# Patient Record
Sex: MALE | Race: WHITE | URBAN - METROPOLITAN AREA
[De-identification: names, ages, dates, MRNs, and addresses within clinical notes are randomized per-mention and may not be internally consistent; named-entity substitution may affect disease eponyms.]

---

## 2018-03-05 ENCOUNTER — CONVERSION ENCOUNTER (OUTPATIENT)
Dept: OTHER | Facility: HOSPITAL | Age: 78
End: 2018-03-05

## 2018-03-05 ENCOUNTER — OFFICE VISIT CONVERTED (OUTPATIENT)
Dept: CARDIOLOGY | Facility: CLINIC | Age: 78
End: 2018-03-05
Attending: SPECIALIST

## 2018-06-11 ENCOUNTER — CONVERSION ENCOUNTER (OUTPATIENT)
Dept: OTHER | Facility: HOSPITAL | Age: 78
End: 2018-06-11

## 2018-06-11 ENCOUNTER — OFFICE VISIT CONVERTED (OUTPATIENT)
Dept: CARDIOLOGY | Facility: CLINIC | Age: 78
End: 2018-06-11
Attending: SPECIALIST

## 2018-09-20 ENCOUNTER — OFFICE VISIT CONVERTED (OUTPATIENT)
Dept: CARDIOLOGY | Facility: CLINIC | Age: 78
End: 2018-09-20
Attending: SPECIALIST

## 2018-09-20 ENCOUNTER — CONVERSION ENCOUNTER (OUTPATIENT)
Dept: OTHER | Facility: HOSPITAL | Age: 78
End: 2018-09-20

## 2019-01-24 ENCOUNTER — OFFICE VISIT CONVERTED (OUTPATIENT)
Dept: CARDIOLOGY | Facility: CLINIC | Age: 79
End: 2019-01-24
Attending: SPECIALIST

## 2019-01-24 ENCOUNTER — CONVERSION ENCOUNTER (OUTPATIENT)
Dept: OTHER | Facility: HOSPITAL | Age: 79
End: 2019-01-24

## 2021-05-14 NOTE — PROGRESS NOTES
"   Progress Note      Patient Name: Arias Dias   Patient ID: [id number]   Sex: Male   YOB: 1940    Primary Care Provider: Alisa SHIN   Referring Provider: Alisa SHIN    Visit Date: January 24, 2019    Provider: Mingo Garcia MD   Location: [facility]   Location Address: [address] [place] [address]  [id number]   Location Phone: [phone]          Chief Complaint  · Atrial fibrillation  · Hypertension  · Pedal edema      History Of Present Illness  Arias Dias is a 78 year old /White male with a history of chronic atrial fibrillation; palpitations; hypertension. He has developed pedal edema for the last 2-3 weeks which has got worse recently. No syncopal or presyncopal episodes. Patient denies chest pain. He has shortness of breath on exertion.   Medications  Meds at present include: Nifedipine ER 60 mg qd; Irbesartan 160/12.5 mg qd; Metoprolol 50 mg bid; Tamsulosin 0.4 mg qd; Warfarin 5 mg as directed.   PAST MEDICAL HISTORY: negative for diabetes and chest pain; positive for hypertension.   FAMILY HISTORY: positive for diabetes, negative for hypertension and heart disease.   PSYCHO/SOCIAL HISTORY: negative for depression; does not drink alcohol and does not smoke.       Review of Systems  · Cardiovascular  o Admits  o : shortness of breath and pedal edema  o Denies  o : chest pain or angina pectoris  · Respiratory  o Denies  o : chronic or frequent cough, asthma or wheezing      Vitals  Date Time BP Position Site L\R Cuff Size HR RR TEMP(F) WT  HT  BMI kg/m2 BSA m2 O2 Sat HC       11/02/2017 12:42 /77 Sitting    91 - R   225lbs 0oz 6'  0\" 30.52 2.28     03/05/2018 12:17 /74 Sitting    93 - R   250lbs 0oz 6'  0\" 33.91 2.4     06/11/2018 12:43 /80 Sitting    80 - R   248lbs 0oz 6'  0\" 33.63 2.39     09/20/2018 02:09 /70 Sitting    70 - R   243lbs 0oz 6'  0\" 32.96 2.37     01/24/2019 12:44 /73 Sitting    75 - R   " "252lbs 0oz 6'  0\" 34.18 2.41           Physical Examination  · Constitutional  o Appearance  o : Alert and Oriented X3. The patient is obese.   · Respiratory  o Inspection of Chest  o : No chest wall deformities, moving equal  o Auscultation of Lungs  o : Good air entry with vesicular breath sounds  · Cardiovascular  o Heart  o :   § Auscultation of Heart  § : S1 and S2 are regular. No S3. No S4. No murmurs.  o Peripheral Vascular System  o :   § Extremities  § : Peripheral pulses were well felt. 2+ pedal edema. No cyanosis.  · Gastrointestinal  o Abdominal Examination  o : No masses or tenderness noted.   · EKG  o EKG  o : was done today  o Indications  o : atrial fibrillation  o Results  o : shows atrial fibrillation ith controlled heart rate; left axis deviation; nonspecific ST T-wave changes          Assessment     IMPRESSION/PLAN    1. Chronic atrial fibrillation with a controlled heart rate.  Continue current dose of Warfarin for stroke prevention and Metoprolol for rate control.  2. Essential hypertension controlled.  Continue current dose of Nifedipine and Irbesartan  3. Pedal edema. In view of his pedal edema I asked him to decrease his fluid and salt intake. I will start him on Lasix 40 mg qd. Will check his BMP in two weeks.  4. Nonrheumatic mitral valve insufficiency stable.  5. He will see me back in 3 months.    MD LEVAR Bradford/wt          Plan  · Instructions  o This note was transcribed by Elvira Tobar. LEVAR/wt  o The above service was transcribed by Elvira Tobar on my behalf and I attest to the accuracy of the note. LEVAR            Electronically Signed by: Madison Tobar-, -Author on January 25, 2019 03:04:06 PM  Electronically Co-signed by: Mingo Garcia MD -Reviewer on January 30, 2019 12:04:58 PM  "

## 2021-05-14 NOTE — PROGRESS NOTES
"   Progress Note      Patient Name: Arias Dias   Patient ID: [id number]   Sex: Male   YOB: 1940    Primary Care Provider: Alisa RAI   Referring Provider: Alisa RAI    Visit Date: March 5, 2018    Provider: Mingo Garcia MD   Location: [facility]   Location Address: [address] [place] [address]  [id number]   Location Phone: [phone]          Chief Complaint  · Atrial fibrillation  · Hypertension      History Of Present Illness  Arias Dias is a 77 year old /White male with a history of atrial fibrillation; hypertension. No syncopal or presyncopal episodes. Patient denies chest pain. No shortness of breath, PND or orthopnea.   Medications  Meds at present include: Nifedipine ER 60 mg; Valsartan/Hyrochlorathiazide 160//12.5 mg qd; Metoprolol 50 mg bid; Tamsulosin 0.4 mg qd; Warfarin 5 mg as directed.   PAST MEDICAL HISTORY: negative for diabetes and chest pain; positive for hypertension.   FAMILY HISTORY: positive for diabetes and hypertension; negative for heart disease.   PSYCHO/SOCIAL HISTORY: negative for depression; does not drink alcohol and does not smoke.       Review of Systems  · Cardiovascular  o Denies  o : swelling (feet, ankles, hands), shortness of breath while walking or lying flat, chest pain or angina pectoris; palpitations  · Respiratory  o Denies  o : chronic or frequent cough, asthma or wheezing      Vitals  Date Time BP Position Site L\R Cuff Size HR RR TEMP(F) WT  HT  BMI kg/m2 BSA m2 O2 Sat HC       11/02/2017 12:42 /77 Sitting    91 - R   225lbs 0oz 6'  0\" 30.52 2.28     03/05/2018 12:17 /74 Sitting    93 - R   250lbs 0oz 6'  0\" 33.91 2.4           Physical Examination  · Constitutional  o Appearance  o : Alert and Oriented X3. The patient is obese.   · Respiratory  o Inspection of Chest  o : No chest wall deformities, moving equal  o Auscultation of Lungs  o : Good air entry with vesicular breath " sounds  · Cardiovascular  o Heart  o :   § Auscultation of Heart  § : S1 and S2 are regular. No S3. No S4. No murmurs.  o Peripheral Vascular System  o :   § Extremities  § : Peripheral pulses were well felt. No edema. No cyanosis.  · Gastrointestinal  o Abdominal Examination  o : No masses or tenderness noted.   · EKG  o EKG  o : was not performed in the office today          Assessment     IMPRESSION/PLAN  1. Chronic atrial fibrillation with a controlled heart rate.  Continue current dose of Warfarin and Metoprolol.  2. Essential hypertension controlled.  Continue current dose of Nifedipine.  3. Valvular heart disease stable. Nonrheumatic mitral valve insufficiency stable.  4. He will see me back in 3-4 months.      LEAVR/wft       Plan  · Instructions  o This note was transcribed by Elvira Tobar. LEVAR/wt  o The above service was transcribed by Elvira Tobar on my behalf and I attest to the accuracy of the note. LEVAR            Electronically Signed by: Madison Tobar-, -Author on March 6, 2018 07:24:38 AM  Electronically Co-signed by: Mingo Garcia MD -Reviewer on March 18, 2018 10:47:43 AM

## 2021-05-14 NOTE — PROGRESS NOTES
"   Progress Note      Patient Name: Arias Dias   Patient ID: [id number]   Sex: Male   YOB: 1940    Primary Care Provider: Alisa RAI   Referring Provider: Alisa RAI    Visit Date: June 11, 2018    Provider: Mingo Garcia MD   Location: [facility]   Location Address: [address] [place] [address]  [id number]   Location Phone: [phone]          Chief Complaint  · Atrial fibrillation  · Palpitations  · Pedal edema      History Of Present Illness  Arias Dias is a 77 year old /White male with a history of atrial fibrillation; hypertension. He has some occasional palpitations and mild pedal edema. No syncopal or presyncopal episodes. Blood pressure well controlled at home. Patient denies chest pain. No shortness of breath, PND or orthopnea.   Medications  Meds at present include: Nifedipine ER 60 mg qd; Valsartan/Hyrochlorathiazide 160//12.5 mg qd; Metoprolol 50 mg bid; Tamsulosin 0.4 mg qd; Warfarin 5 mg as directed.   PAST MEDICAL HISTORY: negative for diabetes and chest pain; positive for hypertension.   FAMILY HISTORY: positive for diabetes; negative for hypertension and heart disease.   PSYCHO/SOCIAL HISTORY: negative for depression; does not drink alcohol and does not smoke.       Review of Systems  · Cardiovascular  o Admits  o : pedal edema and occasional palpitations  o Denies  o : shortness of breath while walking or lying flat, chest pain or angina pectoris  · Respiratory  o Denies  o : chronic or frequent cough, asthma or wheezing      Vitals  Date Time BP Position Site L\R Cuff Size HR RR TEMP(F) WT  HT  BMI kg/m2 BSA m2 O2 Sat HC       11/02/2017 12:42 /77 Sitting    91 - R   225lbs 0oz 6'  0\" 30.52 2.28     03/05/2018 12:17 /74 Sitting    93 - R   250lbs 0oz 6'  0\" 33.91 2.4     06/11/2018 12:43 /80 Sitting    80 - R   248lbs 0oz 6'  0\" 33.63 2.39           Physical Examination  · Constitutional  o Appearance  o : " Alert and Oriented X3. The patient is obese.   · Respiratory  o Inspection of Chest  o : No chest wall deformities, moving equal  o Auscultation of Lungs  o : Good air entry with vesicular breath sounds  · Cardiovascular  o Heart  o :   § Auscultation of Heart  § : S1 and S2 are regular. No S3. No S4. No murmurs.  o Peripheral Vascular System  o :   § Extremities  § : Peripheral pulses were well felt. No edema. No cyanosis.  · Gastrointestinal  o Abdominal Examination  o : No masses or tenderness noted.   · EKG  o EKG  o : Was performed in the office today  o Indications  o : atrial fibrillation  o Results  o : atrial fibrillation; left axis deviation; septal MI age undetermined.   o Comparison  o : no change from previous EKG wahich I compared to          Assessment     IMPRESSION/PLAN  1. Chronic atrial fibrillation with a controlled heart rate.  Continue current dose of Warfarin and Metoprolol.  2. Essential hypertension controlled.  Continue current dose of Nifedipine and Valsartan.  3. Nonrheumatic mitral valve insufficiency stable.  4. He will see me back in 3 months.      LEVAR/wft       Plan  · Instructions  o This note was transcribed by Elvira Tobar. LEVAR/wt  o The above service was transcribed by Elvira Tobar on my behalf and I attest to the accuracy of the note. LEVAR            Electronically Signed by: Madison Tobar-, -Author on June 12, 2018 12:01:08 PM  Electronically Co-signed by: Mingo Garcia MD -Reviewer on June 18, 2018 01:17:18 PM

## 2021-05-14 NOTE — PROGRESS NOTES
"   Progress Note      Patient Name: Arias Dias   Patient ID: [id number]   Sex: Male   YOB: 1940    Primary Care Provider: Alisa SHIN   Referring Provider: Alisa SHIN    Visit Date: September 20, 2018    Provider: Mingo Garcia MD   Location: [facility]   Location Address: [address] [place] [address]  [id number]   Location Phone: [phone]          Chief Complaint  · Atrial fibrillation  · Palpitations      History Of Present Illness  Arias Dias is a 78 year old /White male with a history of chronic atrial fibrillation; palpitations; hypertension. He has some occasional palpitations. No syncopal or presyncopal episodes. Patient denies chest pain. No shortness of breath, PND or orthopnea.   Medications  Meds at present include: Nifedipine ER 60 mg qd; Losartan 100 mg qd; Metoprolol 50 mg bid; Tamsulosin 0.4 mg qd; Warfarin 5 mg as directed.   PAST MEDICAL HISTORY: negative for diabetes and chest pain; positive for hypertension.   FAMILY HISTORY: negative for diabetes, hypertension and heart disease.   PSYCHO/SOCIAL HISTORY: negative for depression; does not drink alcohol and does not smoke.       Review of Systems  · Cardiovascular  o Admits  o : occasional palpitations  o Denies  o : shortness of breath while walking or lying flat, chest pain or angina pectoris  · Respiratory  o Denies  o : chronic or frequent cough, asthma or wheezing      Vitals  Date Time BP Position Site L\R Cuff Size HR RR TEMP(F) WT  HT  BMI kg/m2 BSA m2 O2 Sat HC       11/02/2017 12:42 /77 Sitting    91 - R   225lbs 0oz 6'  0\" 30.52 2.28     03/05/2018 12:17 /74 Sitting    93 - R   250lbs 0oz 6'  0\" 33.91 2.4     06/11/2018 12:43 /80 Sitting    80 - R   248lbs 0oz 6'  0\" 33.63 2.39     09/20/2018 02:09 /70 Sitting    70 - R   243lbs 0oz 6'  0\" 32.96 2.37           Physical Examination  · Constitutional  o Appearance  o : Alert and Oriented X3. The " patient is obese.   · Respiratory  o Inspection of Chest  o : No chest wall deformities, moving equal  o Auscultation of Lungs  o : Good air entry with vesicular breath sounds  · Cardiovascular  o Heart  o :   § Auscultation of Heart  § : S1 and S2 are regular. No S3. No S4. No murmurs.  o Peripheral Vascular System  o :   § Extremities  § : Peripheral pulses were well felt. No edema. No cyanosis.  · Gastrointestinal  o Abdominal Examination  o : No masses or tenderness noted.           Assessment     IMPRESSION/PLAN    1. Chronic atrial fibrillation with a controlled heart rate.  Continue current dose of Warfarin and Metoprolol.  2. Essential hypertension controlled.  Continue current dose of Nifedipine and Losartan  3. Nonrheumatic mitral valve insufficiency stable.  4. He will see me back in 6 months.      LEVAR/wt       Plan  · Instructions  o This note was transcribed by Elvira Tobar. LEVAR/wt  o The above service was transcribed by Elvira Tobar on my behalf and I attest to the accuracy of the note. LEVAR            Electronically Signed by: Madison Tobar-, -Author on September 21, 2018 08:07:31 AM  Electronically Co-signed by: Mingo Garcia MD -Reviewer on September 28, 2018 01:26:25 PM

## 2021-05-15 VITALS
HEIGHT: 72 IN | BODY MASS INDEX: 34.13 KG/M2 | WEIGHT: 252 LBS | DIASTOLIC BLOOD PRESSURE: 73 MMHG | SYSTOLIC BLOOD PRESSURE: 130 MMHG | HEART RATE: 75 BPM

## 2021-05-16 VITALS
DIASTOLIC BLOOD PRESSURE: 70 MMHG | HEART RATE: 70 BPM | WEIGHT: 243 LBS | SYSTOLIC BLOOD PRESSURE: 126 MMHG | BODY MASS INDEX: 32.91 KG/M2 | HEIGHT: 72 IN

## 2021-05-16 VITALS
WEIGHT: 248 LBS | BODY MASS INDEX: 33.59 KG/M2 | DIASTOLIC BLOOD PRESSURE: 80 MMHG | SYSTOLIC BLOOD PRESSURE: 127 MMHG | HEIGHT: 72 IN | HEART RATE: 80 BPM

## 2021-05-16 VITALS
SYSTOLIC BLOOD PRESSURE: 136 MMHG | HEART RATE: 93 BPM | HEIGHT: 72 IN | DIASTOLIC BLOOD PRESSURE: 74 MMHG | BODY MASS INDEX: 33.86 KG/M2 | WEIGHT: 250 LBS